# Patient Record
Sex: FEMALE | Employment: FULL TIME | URBAN - METROPOLITAN AREA
[De-identification: names, ages, dates, MRNs, and addresses within clinical notes are randomized per-mention and may not be internally consistent; named-entity substitution may affect disease eponyms.]

---

## 2024-10-09 ENCOUNTER — APPOINTMENT (OUTPATIENT)
Dept: RADIOLOGY | Facility: IMAGING CENTER | Age: 54
End: 2024-10-09
Attending: NURSE PRACTITIONER

## 2024-10-09 ENCOUNTER — OCCUPATIONAL MEDICINE (OUTPATIENT)
Dept: URGENT CARE | Facility: PHYSICIAN GROUP | Age: 54
End: 2024-10-09
Payer: COMMERCIAL

## 2024-10-09 VITALS
OXYGEN SATURATION: 94 % | DIASTOLIC BLOOD PRESSURE: 70 MMHG | WEIGHT: 200 LBS | RESPIRATION RATE: 16 BRPM | HEART RATE: 96 BPM | SYSTOLIC BLOOD PRESSURE: 100 MMHG | BODY MASS INDEX: 36.8 KG/M2 | TEMPERATURE: 97.5 F | HEIGHT: 62 IN

## 2024-10-09 DIAGNOSIS — S86.912A STRAIN OF LEFT KNEE, INITIAL ENCOUNTER: ICD-10-CM

## 2024-10-09 DIAGNOSIS — M25.562 ACUTE PAIN OF LEFT KNEE: ICD-10-CM

## 2024-10-09 DIAGNOSIS — Z02.1 PRE-EMPLOYMENT DRUG SCREENING: Primary | ICD-10-CM

## 2024-10-09 LAB
AMP AMPHETAMINE: NEGATIVE
BREATH ALCOHOL COMMENT: NORMAL
COC COCAINE: NEGATIVE
INT CON NEG: NEGATIVE
INT CON POS: POSITIVE
MET METHAMPHETAMINES: NEGATIVE
OPI OPIATES: NEGATIVE
PCP PHENCYCLIDINE: NEGATIVE
POC BREATHALIZER: 0 PERCENT (ref 0–0.01)
POC DRUG COMMENT 753798-OCCUPATIONAL HEALTH: NEGATIVE
THC: NEGATIVE

## 2024-10-09 PROCEDURE — 3074F SYST BP LT 130 MM HG: CPT | Performed by: NURSE PRACTITIONER

## 2024-10-09 PROCEDURE — 3078F DIAST BP <80 MM HG: CPT | Performed by: NURSE PRACTITIONER

## 2024-10-09 PROCEDURE — 80305 DRUG TEST PRSMV DIR OPT OBS: CPT | Performed by: NURSE PRACTITIONER

## 2024-10-09 PROCEDURE — 99202 OFFICE O/P NEW SF 15 MIN: CPT | Performed by: NURSE PRACTITIONER

## 2024-10-09 PROCEDURE — 73564 X-RAY EXAM KNEE 4 OR MORE: CPT | Mod: TC,LT | Performed by: NURSE PRACTITIONER

## 2024-10-09 PROCEDURE — 82075 ASSAY OF BREATH ETHANOL: CPT | Performed by: NURSE PRACTITIONER

## 2024-10-09 RX ORDER — HYDROCODONE BITARTRATE AND ACETAMINOPHEN 7.5; 325 MG/1; MG/1
TABLET ORAL
COMMUNITY

## 2024-10-09 RX ORDER — CEFUROXIME AXETIL 500 MG/1
500 TABLET ORAL 2 TIMES DAILY
COMMUNITY
Start: 2024-09-24

## 2024-10-09 ASSESSMENT — ENCOUNTER SYMPTOMS
BACK PAIN: 1
TINGLING: 0
MYALGIAS: 1
SENSORY CHANGE: 0

## 2024-10-13 ENCOUNTER — OCCUPATIONAL MEDICINE (OUTPATIENT)
Dept: URGENT CARE | Facility: PHYSICIAN GROUP | Age: 54
End: 2024-10-13
Payer: COMMERCIAL

## 2024-10-13 VITALS
OXYGEN SATURATION: 97 % | WEIGHT: 200 LBS | DIASTOLIC BLOOD PRESSURE: 70 MMHG | SYSTOLIC BLOOD PRESSURE: 104 MMHG | TEMPERATURE: 97 F | HEART RATE: 97 BPM | HEIGHT: 62 IN | BODY MASS INDEX: 36.8 KG/M2 | RESPIRATION RATE: 16 BRPM

## 2024-10-13 DIAGNOSIS — S86.912D KNEE STRAIN, LEFT, SUBSEQUENT ENCOUNTER: ICD-10-CM

## 2024-10-13 PROCEDURE — 99213 OFFICE O/P EST LOW 20 MIN: CPT

## 2024-10-13 PROCEDURE — 3074F SYST BP LT 130 MM HG: CPT

## 2024-10-13 PROCEDURE — 3078F DIAST BP <80 MM HG: CPT

## 2024-10-21 ENCOUNTER — OCCUPATIONAL MEDICINE (OUTPATIENT)
Dept: OCCUPATIONAL MEDICINE | Facility: CLINIC | Age: 54
End: 2024-10-21
Payer: COMMERCIAL

## 2024-10-21 VITALS
BODY MASS INDEX: 34.96 KG/M2 | SYSTOLIC BLOOD PRESSURE: 110 MMHG | DIASTOLIC BLOOD PRESSURE: 68 MMHG | HEART RATE: 96 BPM | RESPIRATION RATE: 18 BRPM | HEIGHT: 62 IN | WEIGHT: 190 LBS

## 2024-10-21 DIAGNOSIS — S83.92XD SPRAIN OF LEFT KNEE, UNSPECIFIED LIGAMENT, SUBSEQUENT ENCOUNTER: ICD-10-CM

## 2024-10-21 PROCEDURE — 3078F DIAST BP <80 MM HG: CPT | Performed by: PREVENTIVE MEDICINE

## 2024-10-21 PROCEDURE — 99204 OFFICE O/P NEW MOD 45 MIN: CPT | Performed by: PREVENTIVE MEDICINE

## 2024-10-21 PROCEDURE — 3074F SYST BP LT 130 MM HG: CPT | Performed by: PREVENTIVE MEDICINE

## 2024-10-21 RX ORDER — DICLOFENAC SODIUM 75 MG/1
75 TABLET, DELAYED RELEASE ORAL 2 TIMES DAILY
Qty: 60 TABLET | Refills: 0 | Status: SHIPPED | OUTPATIENT
Start: 2024-10-21

## 2024-11-18 ENCOUNTER — OCCUPATIONAL MEDICINE (OUTPATIENT)
Dept: OCCUPATIONAL MEDICINE | Facility: CLINIC | Age: 54
End: 2024-11-18
Payer: COMMERCIAL

## 2024-11-18 VITALS
RESPIRATION RATE: 16 BRPM | WEIGHT: 194 LBS | SYSTOLIC BLOOD PRESSURE: 102 MMHG | HEART RATE: 79 BPM | BODY MASS INDEX: 35.7 KG/M2 | TEMPERATURE: 97.5 F | DIASTOLIC BLOOD PRESSURE: 70 MMHG | HEIGHT: 62 IN

## 2024-11-18 DIAGNOSIS — S83.92XD SPRAIN OF LEFT KNEE, UNSPECIFIED LIGAMENT, SUBSEQUENT ENCOUNTER: ICD-10-CM

## 2024-11-18 PROCEDURE — 3078F DIAST BP <80 MM HG: CPT | Performed by: PREVENTIVE MEDICINE

## 2024-11-18 PROCEDURE — 1125F AMNT PAIN NOTED PAIN PRSNT: CPT | Performed by: PREVENTIVE MEDICINE

## 2024-11-18 PROCEDURE — 3074F SYST BP LT 130 MM HG: CPT | Performed by: PREVENTIVE MEDICINE

## 2024-11-18 PROCEDURE — 99213 OFFICE O/P EST LOW 20 MIN: CPT | Performed by: PREVENTIVE MEDICINE

## 2024-11-18 ASSESSMENT — PAIN SCALES - GENERAL: PAINLEVEL_OUTOF10: 7=MODERATE-SEVERE PAIN

## 2024-11-18 NOTE — LETTER
PHYSICIAN’S AND CHIROPRACTIC PHYSICIAN'S   PROGRESS REPORT   CERTIFICATION OF DISABILITY Claim Number:     Social Security Number:    Patient’s Name: Ghada Chacon Date of Injury: 10/9/2024   Employer: [employer] Name of MCO (if applicable):      Patient’s Job Description/Occupation: Shipping       Previous Injuries/Diseases/Surgeries Contributing to the Condition:         Diagnosis: (S83.92XD) Sprain of left knee, unspecified ligament, subsequent encounter      Related to the Industrial Injury? Yes     Explain:        Objective Medical Findings: Left Knee: No gross deformity.  Tenderness palpation over the patellar tendon and lateral knee.  Able to squat most of the way down with mild discomfort.    MRI Left Knee: High-grade chondral fissures along the lateral facet of patella with underlying marrow edema. No internal derangement. Multiloculated cystic lesion along the posterior medial aspect of the knee, possibly representing pes anserine bursitis versus ganglion cyst.           None - Discharged                         Stable  No                 Ratable  No     X   Generally Improved                         Condition Worsened                  Condition Same  May Have Suffered a Permanent Disability No     Treatment Plan:    MRI did not show any internal derangement, will continue conservative management  Begin physical therapy as scheduled  OTC ibuprofen/Tylenol as needed.  Okay to use hinged knee brace as needed.  OTC muscle creams/ointments as needed  If symptoms continue to improve will lessen work restrictions are released to full duty              No Change in Therapy                  PT/OT Prescribed                      Medication May be Used While Working        Case Management                          PT/OT Discontinued    Consultation    Further Diagnostic Studies:    Prescription(s)                 Released to FULL DUTY /No Restrictions on (Date):       Certified TOTALLY  TEMPORARILY DISABLED (Indicate Dates) From:   To:    X  Released to RESTRICTED/Modified Duty on (Date): From: 11/18/2024 To: 12/2/2024  Restrictions Are:  Temporary      No Sitting    No Standing    No Pulling Other:  Seated work 50% of shift       No Bending at Waist     No Stooping     No Lifting        No Carrying     No Walking Lifting Restricted to (lbs.):  < or = to 10 pounds       No Pushing        No Climbing     No Reaching Above Shoulders       Date of Next Visit:  12/2/2024 @ 8:45 am Date of this Exam: 11/18/2024 Physician/Chiropractic Physician Name: Sunny Stone D.O. Physician/Chiropractic Physician Signature:  Sunny Stone DO [facility]:  [address] [place] [address], [address] [place], [place] [address] - Telephone [phone] [place]:  [address] [place] [address], [address] [place], [place] [address] - Telephone [phone]    [email or web address]  D-39 (Rev. 10/24)

## 2024-11-18 NOTE — PROGRESS NOTES
"Subjective:     Ghada Chacon is a 54 y.o. 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  She was coming down the ladder when her left foot got stuck between the line and the step causing her left knee to twist.  She was seen in urgent care x 2, x-rays of the left knee were negative for acute findings.  She was advised NSAIDs in the brace.     10/21/2024: Patient states overall symptoms are about the same.  She continues to have mostly anterior and lateral knee pain.  She states she has a lot of pain with weightbearing still.  She does not feel like she can put down her full weight without pain.  She has not really tried this walk.  Going up and down stairs is extremely difficult.  Denies any weakness or instability but is not really walking that much.  She states the hinged knee brace only helps a little bit.  She states she tried ibuprofen and Tylenol without any relief.  Denies prior knee injuries.    11/18/2024: Patient has had some improvement in symptoms.  She states she has less pain than before.  She is able to squat with some discomfort.  Pain mostly over the lateral knee.  Was able to get the MRI performed and has physical therapy scheduled for tomorrow.    ROS    SOCHX: Works as a  at [employer]  FH: No pertinent family history to this problem.       Objective:     /70 (BP Location: Right arm, Patient Position: Sitting, BP Cuff Size: Adult)   Pulse 79   Temp 36.4 °C (97.5 °F)   Resp 16   Ht 1.575 m (5' 2\")   Wt 88 kg (194 lb)   BMI 35.48 kg/m²     Constitutional: Patient is in no acute distress. Appears well-developed and well-nourished.   Cardiovascular: Normal rate.    Pulmonary/Chest: Effort normal. No respiratory distress.   Neurological: Patient is alert and oriented to person, place, and time.   Skin: Skin is warm and dry.   Psychiatric: Normal mood " and affect. Behavior is normal.     Left Knee: No gross deformity.  Tenderness palpation over the patellar tendon and lateral knee.  Able to squat most of the way down with mild discomfort.    MRI Left Knee: High-grade chondral fissures along the lateral facet of patella with underlying marrow edema. No internal derangement. Multiloculated cystic lesion along the posterior medial aspect of the knee, possibly representing pes anserine bursitis versus ganglion cyst.      Assessment/Plan:     1. Sprain of left knee, unspecified ligament, subsequent encounter      MRI did not show any internal derangement, will continue conservative management  Begin physical therapy as scheduled  OTC ibuprofen/Tylenol as needed.  Okay to use hinged knee brace as needed.  OTC muscle creams/ointments as needed  If symptoms continue to improve will lessen work restrictions are released to full duty         Work Status: Restricted Duty - see D-39 or other state/federal worker's compensation forms for specific restrictions if applicable  Follow up 2 weeks    Differential diagnosis, natural history, supportive care, and indications for immediate follow-up discussed.    Approximately 21 minutes were spent in reviewing notes, preparing for visit, obtaining history, exam and evaluation, patient counseling/education, and post visit documentation/orders. Significant time was spent completing state/federal worker's compensation forms.

## 2024-12-02 ENCOUNTER — OCCUPATIONAL MEDICINE (OUTPATIENT)
Dept: OCCUPATIONAL MEDICINE | Facility: CLINIC | Age: 54
End: 2024-12-02
Payer: COMMERCIAL

## 2024-12-02 VITALS
OXYGEN SATURATION: 96 % | HEIGHT: 64 IN | HEART RATE: 99 BPM | BODY MASS INDEX: 33.8 KG/M2 | TEMPERATURE: 96.8 F | RESPIRATION RATE: 16 BRPM | WEIGHT: 198 LBS | DIASTOLIC BLOOD PRESSURE: 76 MMHG | SYSTOLIC BLOOD PRESSURE: 122 MMHG

## 2024-12-02 DIAGNOSIS — S83.92XD SPRAIN OF LEFT KNEE, UNSPECIFIED LIGAMENT, SUBSEQUENT ENCOUNTER: ICD-10-CM

## 2024-12-02 PROCEDURE — 3078F DIAST BP <80 MM HG: CPT | Performed by: PREVENTIVE MEDICINE

## 2024-12-02 PROCEDURE — 99213 OFFICE O/P EST LOW 20 MIN: CPT | Performed by: PREVENTIVE MEDICINE

## 2024-12-02 PROCEDURE — 3074F SYST BP LT 130 MM HG: CPT | Performed by: PREVENTIVE MEDICINE

## 2024-12-02 NOTE — LETTER
PHYSICIAN’S AND CHIROPRACTIC PHYSICIAN'S   PROGRESS REPORT   CERTIFICATION OF DISABILITY Claim Number:     Social Security Number:    Patient’s Name: Ghada Chacon Date of Injury: 10/9/2024   Employer: [employer] Name of MCO (if applicable):      Patient’s Job Description/Occupation: Shipping       Previous Injuries/Diseases/Surgeries Contributing to the Condition:         Diagnosis: (S83.92XD) Sprain of left knee, unspecified ligament, subsequent encounter      Related to the Industrial Injury? Yes     Explain:        Objective Medical Findings: Left Knee: No gross deformity.  Area of tenderness over anterior and lateral knee.  Able to squat most of the way down with mild discomfort.     MRI Left Knee: High-grade chondral fissures along the lateral facet of patella with underlying marrow edema. No internal derangement. Multiloculated cystic lesion along the posterior medial aspect of the knee, possibly representing pes anserine bursitis versus ganglion cyst.           None - Discharged                         Stable  No                 Ratable  No        Generally Improved                         Condition Worsened               X   Condition Same  May Have Suffered a Permanent Disability No     Treatment Plan:    Continue physical therapy, placed referral for more visits  OTC ibuprofen/Tylenol as needed.  Okay to use hinged knee brace as needed.  OTC muscle creams/ointments as needed             No Change in Therapy                  PT/OT Prescribed                      Medication May be Used While Working        Case Management                          PT/OT Discontinued    Consultation    Further Diagnostic Studies:    Prescription(s)                 Released to FULL DUTY /No Restrictions on (Date):       Certified TOTALLY TEMPORARILY DISABLED (Indicate Dates) From:   To:    X  Released to RESTRICTED/Modified Duty on (Date): From: 12/2/2024 To: 1/2/2025  Restrictions Are:   Temporary      No Sitting    No Standing    No Pulling Other:  Seated work 50% of shift       No Bending at Waist     No Stooping     No Lifting        No Carrying     No Walking Lifting Restricted to (lbs.):  < or = to 10 pounds       No Pushing        No Climbing     No Reaching Above Shoulders       Date of Next Visit:  1/2/2025 @ 10:45 am Date of this Exam: 12/2/2024 Physician/Chiropractic Physician Name: Sunny Stone D.O. Physician/Chiropractic Physician Signature:  Sunny Stone DO MPH     [place]:  [address] [place] [address], [address] [place], [place] [address] - Telephone [phone] [place]:  [address] [place] [address], [address] [place], [place] [address] - Telephone [phone]    [email or web address]  D-39 (Rev. 10/24)

## 2024-12-02 NOTE — PROGRESS NOTES
"Subjective:     Ghada Chacon is a 54 y.o. female who presents for Follow-Up (X10/9/24. 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  She was coming down the ladder when her left foot got stuck between the line and the step causing her left knee to twist.  She was seen in urgent care x 2, x-rays of the left knee were negative for acute findings.  She was advised NSAIDs in the brace.     10/21/2024: Patient states overall symptoms are about the same.  She continues to have mostly anterior and lateral knee pain.  She states she has a lot of pain with weightbearing still.  She does not feel like she can put down her full weight without pain.  She has not really tried this walk.  Going up and down stairs is extremely difficult.  Denies any weakness or instability but is not really walking that much.  She states the hinged knee brace only helps a little bit.  She states she tried ibuprofen and Tylenol without any relief.  Denies prior knee injuries.     11/18/2024: Patient has had some improvement in symptoms.  She states she has less pain than before.  She is able to squat with some discomfort.  Pain mostly over the lateral knee.  Was able to get the MRI performed and has physical therapy scheduled for tomorrow.    12/2/2024: Patient states that symptoms are essentially unchanged from last visit.  She states that she still has quite a bit of knee pain.  She states pain is worse if she sitting too long or if she is standing too long.  She states currently her light duty job has her sitting a lot which does seem to aggravate the knee when she gets up.  She just started physical therapy and has only attended 2 sessions thus far.    ROS    SOCHX: Works as a  at [employer]  FH: No pertinent family history to this problem.       Objective:     /76   Pulse 99   Temp 36 °C (96.8 °F)   Resp 16   Ht 1.62 m (5' 3.78\")   Wt 89.8 kg (198 lb)   SpO2 " 96%   BMI 34.22 kg/m²     Constitutional: Patient is in no acute distress. Appears well-developed and well-nourished.   Cardiovascular: Normal rate.    Pulmonary/Chest: Effort normal. No respiratory distress.   Neurological: Patient is alert and oriented to person, place, and time.   Skin: Skin is warm and dry.   Psychiatric: Normal mood and affect. Behavior is normal.     Left Knee: No gross deformity.  Area of tenderness over anterior and lateral knee.  Able to squat most of the way down with mild discomfort.     MRI Left Knee: High-grade chondral fissures along the lateral facet of patella with underlying marrow edema. No internal derangement. Multiloculated cystic lesion along the posterior medial aspect of the knee, possibly representing pes anserine bursitis versus ganglion cyst.      Assessment/Plan:     1. Sprain of left knee, unspecified ligament, subsequent encounter  - Referral to Physical Therapy      Continue physical therapy, placed referral for more visits  OTC ibuprofen/Tylenol as needed.  Okay to use hinged knee brace as needed.  OTC muscle creams/ointments as needed        Work Status: Restricted Duty - see D-39 or other state/federal worker's compensation forms for specific restrictions if applicable  Follow up 5 weeks    Differential diagnosis, natural history, supportive care, and indications for immediate follow-up discussed.    Approximately 21 minutes were spent in reviewing notes, preparing for visit, obtaining history, exam and evaluation, patient counseling/education, and post visit documentation/orders. Significant time was spent completing state/federal worker's compensation forms.

## 2025-01-02 ENCOUNTER — OCCUPATIONAL MEDICINE (OUTPATIENT)
Dept: OCCUPATIONAL MEDICINE | Facility: CLINIC | Age: 55
End: 2025-01-02
Payer: COMMERCIAL

## 2025-01-02 VITALS
RESPIRATION RATE: 16 BRPM | SYSTOLIC BLOOD PRESSURE: 128 MMHG | HEIGHT: 63 IN | HEART RATE: 95 BPM | TEMPERATURE: 97.2 F | WEIGHT: 195 LBS | OXYGEN SATURATION: 97 % | DIASTOLIC BLOOD PRESSURE: 82 MMHG | BODY MASS INDEX: 34.55 KG/M2

## 2025-01-02 DIAGNOSIS — S83.92XD SPRAIN OF LEFT KNEE, UNSPECIFIED LIGAMENT, SUBSEQUENT ENCOUNTER: ICD-10-CM

## 2025-01-02 PROCEDURE — 99213 OFFICE O/P EST LOW 20 MIN: CPT | Performed by: PREVENTIVE MEDICINE

## 2025-01-02 NOTE — PROGRESS NOTES
Subjective:     Ghada Chacon is a 54 y.o. 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  She was coming down the ladder when her left foot got stuck between the line and the step causing her left knee to twist.  She was seen in urgent care x 2, x-rays of the left knee were negative for acute findings.  She was advised NSAIDs in the brace.     10/21/2024: Patient states overall symptoms are about the same.  She continues to have mostly anterior and lateral knee pain.  She states she has a lot of pain with weightbearing still.  She does not feel like she can put down her full weight without pain.  She has not really tried this walk.  Going up and down stairs is extremely difficult.  Denies any weakness or instability but is not really walking that much.  She states the hinged knee brace only helps a little bit.  She states she tried ibuprofen and Tylenol without any relief.  Denies prior knee injuries.     11/18/2024: Patient has had some improvement in symptoms.  She states she has less pain than before.  She is able to squat with some discomfort.  Pain mostly over the lateral knee.  Was able to get the MRI performed and has physical therapy scheduled for tomorrow.     12/2/2024: Patient states that symptoms are essentially unchanged from last visit.  She states that she still has quite a bit of knee pain.  She states pain is worse if she sitting too long or if she is standing too long.  She states currently her light duty job has her sitting a lot which does seem to aggravate the knee when she gets up.  She just started physical therapy and has only attended 2 sessions thus far.    1/2/2025: Patient states that she feels at least 25% improved.  She states she is able to walk much better.  She states stairs at home do not bother her as much.  She states she finished physical therapy and was discharged with home exercise  "program.  She feels that the home exercise program was more helpful than the physical therapy.  Feels comfortable lessening work restrictions somewhat.    ROS    SOCHX: Works as a  at [employer]  FH: No pertinent family history to this problem.       Objective:     /82   Pulse 95   Temp 36.2 °C (97.2 °F)   Resp 16   Ht 1.6 m (5' 3\")   Wt 88.5 kg (195 lb)   SpO2 97%   BMI 34.54 kg/m²     Constitutional: Patient is in no acute distress. Appears well-developed and well-nourished.   Cardiovascular: Normal rate.    Pulmonary/Chest: Effort normal. No respiratory distress.   Neurological: Patient is alert and oriented to person, place, and time.   Skin: Skin is warm and dry.   Psychiatric: Normal mood and affect. Behavior is normal.     Left Knee: No gross deformity.  Area of tenderness over anterior and lateral knee.  Able to squat most of the way down with mild discomfort.     MRI Left Knee: High-grade chondral fissures along the lateral facet of patella with underlying marrow edema. No internal derangement. Multiloculated cystic lesion along the posterior medial aspect of the knee, possibly representing pes anserine bursitis versus ganglion cyst.         Assessment/Plan:     1. Sprain of left knee, unspecified ligament, subsequent encounter      Finished PT, continue home exercise program  Will gradually lessen work restrictions  OTC ibuprofen/Tylenol as needed.  Okay to use hinged knee brace as needed.  OTC muscle creams/ointments as needed      Work Status: Restricted Duty - see D-39 or other state/federal worker's compensation forms for specific restrictions if applicable  Follow up 3 weeks    Differential diagnosis, natural history, supportive care, and indications for immediate follow-up discussed.        "

## 2025-01-02 NOTE — LETTER
PHYSICIAN’S AND CHIROPRACTIC PHYSICIAN'S   PROGRESS REPORT   CERTIFICATION OF DISABILITY Claim Number:     Social Security Number:    Patient’s Name: Ghada Chacon Date of Injury: 10/9/2024   Employer: [employer] Name of MCO (if applicable):      Patient’s Job Description/Occupation: Shipping       Previous Injuries/Diseases/Surgeries Contributing to the Condition:         Diagnosis: (S83.92XD) Sprain of left knee, unspecified ligament, subsequent encounter      Related to the Industrial Injury? Yes     Explain:        Objective Medical Findings: Left Knee: No gross deformity.  Area of tenderness over anterior and lateral knee.  Able to squat most of the way down with mild discomfort.     MRI Left Knee: High-grade chondral fissures along the lateral facet of patella with underlying marrow edema. No internal derangement. Multiloculated cystic lesion along the posterior medial aspect of the knee, possibly representing pes anserine bursitis versus ganglion cyst.              None - Discharged                         Stable  No                 Ratable  No     X   Generally Improved                         Condition Worsened                  Condition Same  May Have Suffered a Permanent Disability No     Treatment Plan:    Finished PT, continue home exercise program  Will gradually lessen work restrictions  OTC ibuprofen/Tylenol as needed.  Okay to use hinged knee brace as needed.  OTC muscle creams/ointments as needed           No Change in Therapy                  PT/OT Prescribed                      Medication May be Used While Working        Case Management                          PT/OT Discontinued    Consultation    Further Diagnostic Studies:    Prescription(s)                 Released to FULL DUTY /No Restrictions on (Date):       Certified TOTALLY TEMPORARILY DISABLED (Indicate Dates) From:   To:    X  Released to RESTRICTED/Modified Duty on (Date): From: 1/2/2025 To:  1/23/2025  Restrictions Are:  Temporary      No Sitting    No Standing    No Pulling Other: Seated work 25% of shift. Does not need to use hinged knee brace, but may use if needed.       No Bending at Waist     No Stooping     No Lifting        No Carrying     No Walking Lifting Restricted to (lbs.):  < or = to 25 pounds       No Pushing        No Climbing     No Reaching Above Shoulders       Date of Next Visit:  Thursday 1/23/2025  @ 2:30 PM Date of this Exam: 1/2/2025 Physician/Chiropractic Physician Name: Sunny Stone D.O. Physician/Chiropractic Physician Signature:  Sunny Stone DO MPH     [place]:  [address] [place] [address], [address] [place], [place] [address] - Telephone [phone] [place]:  [address] [place] [address], [address] [place], [place] [address] - Telephone [phone]    [email or web address]  D-39 (Rev. 10/24)

## 2025-01-23 ENCOUNTER — OCCUPATIONAL MEDICINE (OUTPATIENT)
Dept: OCCUPATIONAL MEDICINE | Facility: CLINIC | Age: 55
End: 2025-01-23
Payer: COMMERCIAL

## 2025-01-23 VITALS
WEIGHT: 194 LBS | HEART RATE: 84 BPM | RESPIRATION RATE: 16 BRPM | DIASTOLIC BLOOD PRESSURE: 80 MMHG | OXYGEN SATURATION: 96 % | HEIGHT: 63 IN | SYSTOLIC BLOOD PRESSURE: 100 MMHG | TEMPERATURE: 98.1 F | BODY MASS INDEX: 34.38 KG/M2

## 2025-01-23 DIAGNOSIS — S83.92XD SPRAIN OF LEFT KNEE, UNSPECIFIED LIGAMENT, SUBSEQUENT ENCOUNTER: ICD-10-CM

## 2025-01-23 PROCEDURE — 99212 OFFICE O/P EST SF 10 MIN: CPT | Performed by: PREVENTIVE MEDICINE

## 2025-01-23 ASSESSMENT — PAIN SCALES - GENERAL: PAINLEVEL_OUTOF10: NO PAIN

## 2025-01-23 NOTE — PROGRESS NOTES
Subjective:     Ghada Chacon is a 54 y.o. 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  She was coming down the ladder when her left foot got stuck between the line and the step causing her left knee to twist.  She was seen in urgent care x 2, x-rays of the left knee were negative for acute findings.  She was advised NSAIDs in the brace.     10/21/2024: Patient states overall symptoms are about the same.  She continues to have mostly anterior and lateral knee pain.  She states she has a lot of pain with weightbearing still.  She does not feel like she can put down her full weight without pain.  She has not really tried this walk.  Going up and down stairs is extremely difficult.  Denies any weakness or instability but is not really walking that much.  She states the hinged knee brace only helps a little bit.  She states she tried ibuprofen and Tylenol without any relief.  Denies prior knee injuries.     11/18/2024: Patient has had some improvement in symptoms.  She states she has less pain than before.  She is able to squat with some discomfort.  Pain mostly over the lateral knee.  Was able to get the MRI performed and has physical therapy scheduled for tomorrow.     12/2/2024: Patient states that symptoms are essentially unchanged from last visit.  She states that she still has quite a bit of knee pain.  She states pain is worse if she sitting too long or if she is standing too long.  She states currently her light duty job has her sitting a lot which does seem to aggravate the knee when she gets up.  She just started physical therapy and has only attended 2 sessions thus far.     1/2/2025: Patient states that she feels at least 25% improved.  She states she is able to walk much better.  She states stairs at home do not bother her as much.  She states she finished physical therapy and was discharged with home exercise program.  She feels that the  "home exercise program was more helpful than the physical therapy.  Feels comfortable lessening work restrictions somewhat.    1/23/2025: Feels significantly improved. Ready for release. Has been doing home exercises with benefit.      ROS           Objective:     /80 (BP Location: Right arm, Patient Position: Sitting, BP Cuff Size: Adult)   Pulse 84   Temp 36.7 °C (98.1 °F)   Resp 16   Ht 1.6 m (5' 3\")   Wt 88 kg (194 lb)   SpO2 96%   BMI 34.37 kg/m²     Constitutional: Patient is in no acute distress. Appears well-developed and well-nourished.   Cardiovascular: Normal rate.    Pulmonary/Chest: Effort normal. No respiratory distress.   Neurological: Patient is alert and oriented to person, place, and time.   Skin: Skin is warm and dry.   Psychiatric: Normal mood and affect. Behavior is normal.     Left Knee: No gross deformity.  FUll ROM, Normal gait.     MRI Left Knee: High-grade chondral fissures along the lateral facet of patella with underlying marrow edema. No internal derangement. Multiloculated cystic lesion along the posterior medial aspect of the knee, possibly representing pes anserine bursitis versus ganglion cyst      Assessment/Plan:     1. Sprain of left knee, unspecified ligament, subsequent encounter      Continue home exercise program  Release from care  Expect gradual resolution of remaining symptoms, return to clinic if symptoms worsen.         Work Status: Full Duty - see D-39 or other state/federal worker's compensation forms for specific restrictions if applicable  Follow up as needed    Differential diagnosis, natural history, supportive care, and indications for immediate follow-up discussed.        "

## 2025-01-23 NOTE — LETTER
PHYSICIAN’S AND CHIROPRACTIC PHYSICIAN'S   PROGRESS REPORT   CERTIFICATION OF DISABILITY Claim Number:     Social Security Number:    Patient’s Name: Ghada Chacon Date of Injury: 10/9/2024   Employer: [employer] Name of MCO (if applicable):      Patient’s Job Description/Occupation: Shipping       Previous Injuries/Diseases/Surgeries Contributing to the Condition:         Diagnosis: (S83.92XD) Sprain of left knee, unspecified ligament, subsequent encounter      Related to the Industrial Injury? Yes     Explain:        Objective Medical Findings: Left Knee: No gross deformity.  FUll ROM, Normal gait.     MRI Left Knee: High-grade chondral fissures along the lateral facet of patella with underlying marrow edema. No internal derangement. Multiloculated cystic lesion along the posterior medial aspect of the knee, possibly representing pes anserine bursitis versus ganglion cyst        X   None - Discharged                         Stable  Yes                 Ratable  No     X   Generally Improved                         Condition Worsened                  Condition Same  May Have Suffered a Permanent Disability No     Treatment Plan:    Continue home exercise program  Release from care  Expect gradual resolution of remaining symptoms, return to clinic if symptoms worsen.              No Change in Therapy                  PT/OT Prescribed                      Medication May be Used While Working        Case Management                          PT/OT Discontinued    Consultation    Further Diagnostic Studies:    Prescription(s)               X  Released to FULL DUTY /No Restrictions on (Date):  1/23/2025    Certified TOTALLY TEMPORARILY DISABLED (Indicate Dates) From:   To:      Released to RESTRICTED/Modified Duty on (Date): From:   To:    Restrictions Are:         No Sitting    No Standing    No Pulling Other:         No Bending at Waist     No Stooping     No Lifting        No Carrying      No Walking Lifting Restricted to (lbs.):          No Pushing        No Climbing     No Reaching Above Shoulders       Date of Next Visit:   Release from care Date of this Exam: 1/23/2025 Physician/Chiropractic Physician Name: Sunny Stone D.O. Physician/Chiropractic Physician Signature:  Sunny Stone DO MPH     [place]:  [address] [place] [address], [address] [place], [place] [address] - Telephone [phone] [place]:  [address] [place] [address], [address] [place], [place] [address] - Telephone [phone]    [email or web address]  D-39 (Rev. 10/24)

## 2025-07-25 ENCOUNTER — HOSPITAL ENCOUNTER (OUTPATIENT)
Dept: LAB | Facility: MEDICAL CENTER | Age: 55
End: 2025-07-25
Payer: COMMERCIAL

## 2025-07-25 LAB
ALBUMIN SERPL BCP-MCNC: 4.4 G/DL (ref 3.2–4.9)
ALBUMIN/GLOB SERPL: 1.6 G/DL
ALP SERPL-CCNC: 124 U/L (ref 30–99)
ALT SERPL-CCNC: 13 U/L (ref 2–50)
ANION GAP SERPL CALC-SCNC: 15 MMOL/L (ref 7–16)
AST SERPL-CCNC: 20 U/L (ref 12–45)
BASOPHILS # BLD AUTO: 0.7 % (ref 0–1.8)
BASOPHILS # BLD: 0.05 K/UL (ref 0–0.12)
BILIRUB SERPL-MCNC: 0.4 MG/DL (ref 0.1–1.5)
BUN SERPL-MCNC: 13 MG/DL (ref 8–22)
CALCIUM ALBUM COR SERPL-MCNC: 9.3 MG/DL (ref 8.5–10.5)
CALCIUM SERPL-MCNC: 9.6 MG/DL (ref 8.5–10.5)
CHLORIDE SERPL-SCNC: 101 MMOL/L (ref 96–112)
CHOLEST SERPL-MCNC: 166 MG/DL (ref 100–199)
CO2 SERPL-SCNC: 23 MMOL/L (ref 20–33)
CREAT SERPL-MCNC: 0.71 MG/DL (ref 0.5–1.4)
EOSINOPHIL # BLD AUTO: 0.1 K/UL (ref 0–0.51)
EOSINOPHIL NFR BLD: 1.3 % (ref 0–6.9)
ERYTHROCYTE [DISTWIDTH] IN BLOOD BY AUTOMATED COUNT: 45.1 FL (ref 35.9–50)
GFR SERPLBLD CREATININE-BSD FMLA CKD-EPI: 100 ML/MIN/1.73 M 2
GLOBULIN SER CALC-MCNC: 2.8 G/DL (ref 1.9–3.5)
GLUCOSE SERPL-MCNC: 90 MG/DL (ref 65–99)
HCT VFR BLD AUTO: 40.9 % (ref 37–47)
HDLC SERPL-MCNC: 46 MG/DL
HGB BLD-MCNC: 13.3 G/DL (ref 12–16)
IMM GRANULOCYTES # BLD AUTO: 0.01 K/UL (ref 0–0.11)
IMM GRANULOCYTES NFR BLD AUTO: 0.1 % (ref 0–0.9)
LDLC SERPL CALC-MCNC: 101 MG/DL
LYMPHOCYTES # BLD AUTO: 2.2 K/UL (ref 1–4.8)
LYMPHOCYTES NFR BLD: 28.8 % (ref 22–41)
MCH RBC QN AUTO: 30.7 PG (ref 27–33)
MCHC RBC AUTO-ENTMCNC: 32.5 G/DL (ref 32.2–35.5)
MCV RBC AUTO: 94.5 FL (ref 81.4–97.8)
MONOCYTES # BLD AUTO: 0.51 K/UL (ref 0–0.85)
MONOCYTES NFR BLD AUTO: 6.7 % (ref 0–13.4)
NEUTROPHILS # BLD AUTO: 4.76 K/UL (ref 1.82–7.42)
NEUTROPHILS NFR BLD: 62.4 % (ref 44–72)
NRBC # BLD AUTO: 0 K/UL
NRBC BLD-RTO: 0 /100 WBC (ref 0–0.2)
PLATELET # BLD AUTO: 255 K/UL (ref 164–446)
PMV BLD AUTO: 10.7 FL (ref 9–12.9)
POTASSIUM SERPL-SCNC: 4 MMOL/L (ref 3.6–5.5)
PROT SERPL-MCNC: 7.2 G/DL (ref 6–8.2)
RBC # BLD AUTO: 4.33 M/UL (ref 4.2–5.4)
SODIUM SERPL-SCNC: 139 MMOL/L (ref 135–145)
TRIGL SERPL-MCNC: 96 MG/DL (ref 0–149)
TSH SERPL DL<=0.005 MIU/L-ACNC: 3.13 UIU/ML (ref 0.38–5.33)
WBC # BLD AUTO: 7.6 K/UL (ref 4.8–10.8)

## 2025-07-25 PROCEDURE — 85025 COMPLETE CBC W/AUTO DIFF WBC: CPT

## 2025-07-25 PROCEDURE — 80061 LIPID PANEL: CPT

## 2025-07-25 PROCEDURE — 80053 COMPREHEN METABOLIC PANEL: CPT

## 2025-07-25 PROCEDURE — 87086 URINE CULTURE/COLONY COUNT: CPT

## 2025-07-25 PROCEDURE — 84443 ASSAY THYROID STIM HORMONE: CPT

## 2025-07-25 PROCEDURE — 36415 COLL VENOUS BLD VENIPUNCTURE: CPT

## 2025-07-27 LAB
BACTERIA UR CULT: NORMAL
SIGNIFICANT IND 70042: NORMAL
SITE SITE: NORMAL
SOURCE SOURCE: NORMAL